# Patient Record
Sex: FEMALE | Race: WHITE | NOT HISPANIC OR LATINO | URBAN - METROPOLITAN AREA
[De-identification: names, ages, dates, MRNs, and addresses within clinical notes are randomized per-mention and may not be internally consistent; named-entity substitution may affect disease eponyms.]

---

## 2023-04-27 ENCOUNTER — WEB ENCOUNTER (OUTPATIENT)
Dept: URBAN - METROPOLITAN AREA CLINIC 9 | Facility: CLINIC | Age: 63
End: 2023-04-27

## 2023-05-01 ENCOUNTER — LAB OUTSIDE AN ENCOUNTER (OUTPATIENT)
Dept: URBAN - METROPOLITAN AREA CLINIC 9 | Facility: CLINIC | Age: 63
End: 2023-05-01

## 2023-05-01 PROBLEM — 300331000: Status: ACTIVE | Noted: 2023-05-01

## 2023-05-02 ENCOUNTER — TELEPHONE ENCOUNTER (OUTPATIENT)
Dept: URBAN - METROPOLITAN AREA CLINIC 9 | Facility: CLINIC | Age: 63
End: 2023-05-02

## 2023-05-02 ENCOUNTER — WEB ENCOUNTER (OUTPATIENT)
Dept: URBAN - METROPOLITAN AREA CLINIC 9 | Facility: CLINIC | Age: 63
End: 2023-05-02

## 2023-05-02 ENCOUNTER — DASHBOARD ENCOUNTERS (OUTPATIENT)
Age: 63
End: 2023-05-02

## 2023-05-02 ENCOUNTER — OFFICE VISIT (OUTPATIENT)
Dept: URBAN - METROPOLITAN AREA CLINIC 9 | Facility: CLINIC | Age: 63
End: 2023-05-02
Payer: MEDICARE

## 2023-05-02 VITALS
BODY MASS INDEX: 24.07 KG/M2 | DIASTOLIC BLOOD PRESSURE: 91 MMHG | HEIGHT: 64 IN | WEIGHT: 141 LBS | SYSTOLIC BLOOD PRESSURE: 130 MMHG

## 2023-05-02 DIAGNOSIS — K63.5 BENIGN COLON POLYP: ICD-10-CM

## 2023-05-02 DIAGNOSIS — K52.9 CHRONIC DIARRHEA: ICD-10-CM

## 2023-05-02 DIAGNOSIS — Z12.11 AVERAGE RISK FOR CRC. DUE TO PT'S CO-MORBID STATE WITH END STAGE DEMENTIA, HIGH RISK FOR ANESTHESIA (PER NEUROLOGY); INABILITY TO TAKE A BOWEL PREP....WOULD NOT ADVISE ANY COLORECTAL CANCER SCREENING INCLUDING STOOL TEST FOR FECAL BLOOD.: ICD-10-CM

## 2023-05-02 DIAGNOSIS — B19.20 HEPATITIS C VIRUS INFECTION WITHOUT HEPATIC COMA, UNSPECIFIED CHRONICITY: ICD-10-CM

## 2023-05-02 DIAGNOSIS — K76.9 LIVER LESION: ICD-10-CM

## 2023-05-02 PROCEDURE — 99204 OFFICE O/P NEW MOD 45 MIN: CPT | Performed by: INTERNAL MEDICINE

## 2023-05-02 RX ORDER — SODIUM, POTASSIUM,MAG SULFATES 17.5-3.13G
177ML SOLUTION, RECONSTITUTED, ORAL ORAL
Qty: 1 | Refills: 0

## 2023-05-02 RX ORDER — LEVOTHYROXINE SODIUM 112 UG/1
TAKE 1 TABLET BY MOUTH EVERY DAY TABLET ORAL
Qty: 90 EACH | Refills: 0 | Status: ACTIVE | COMMUNITY

## 2023-05-02 RX ORDER — LATANOPROST 50 UG/ML
INSTILL 1 DROP INTO BOTH EYES AT BEDTIME SOLUTION OPHTHALMIC
Qty: 10 MILLILITER | Refills: 0 | Status: ACTIVE | COMMUNITY

## 2023-05-02 RX ORDER — SODIUM, POTASSIUM,MAG SULFATES 17.5-3.13G
177ML SOLUTION, RECONSTITUTED, ORAL ORAL ONCE
Qty: 1 | Refills: 0 | OUTPATIENT

## 2023-05-02 RX ORDER — CONJUGATED ESTROGENS 0.62 MG/G
AS DIRECTED CREAM VAGINAL
Status: ACTIVE | COMMUNITY
Start: 2023-05-02

## 2023-05-02 NOTE — HPI-TODAY'S VISIT:
63-year-old female comes in for colon cancer screening and diarrhea. We have not imaged from 5/20/2022 that shows a 2 cm liver lesion.  She does have a history of hep C in the past.  Was told to repeat in 6 months to get a better look Patient is referred for diarrhea. Patient is referred for diarrhea. She is here to establish care.  She was getting regular surveillance colonoscopies by Dr. Caal.  She had one 5 years ago and had polyps.  She had a hysterectomy complicated by what sounds like a small bowel injury status post a resection.  Years later she had to have another small bowel resection due to adhesions.  She says since that time she has had about 4 loose stools a day though not totally loose or just soft.  No hematochezia weight loss abdominal pain.  She said her liver lesion was determined to be a hemangioma and this has been surveyed regularly.  She was treated for hep C with Epclusa and is status post SVR as per patient

## 2023-05-30 ENCOUNTER — OFFICE VISIT (OUTPATIENT)
Dept: URBAN - METROPOLITAN AREA SURGERY CENTER 9 | Facility: SURGERY CENTER | Age: 63
End: 2023-05-30
Payer: MEDICARE

## 2023-05-30 ENCOUNTER — CLAIMS CREATED FROM THE CLAIM WINDOW (OUTPATIENT)
Dept: URBAN - METROPOLITAN AREA CLINIC 4 | Facility: CLINIC | Age: 63
End: 2023-05-30
Payer: MEDICARE

## 2023-05-30 DIAGNOSIS — R19.7 DIARRHEA, UNSPECIFIED TYPE: ICD-10-CM

## 2023-05-30 DIAGNOSIS — K64.8 OTHER HEMORRHOIDS: ICD-10-CM

## 2023-05-30 DIAGNOSIS — K63.89 OTHER SPECIFIED DISEASES OF INTESTINE: ICD-10-CM

## 2023-05-30 PROCEDURE — 88342 IMHCHEM/IMCYTCHM 1ST ANTB: CPT | Performed by: PATHOLOGY

## 2023-05-30 PROCEDURE — 88313 SPECIAL STAINS GROUP 2: CPT | Performed by: PATHOLOGY

## 2023-05-30 PROCEDURE — 45380 COLONOSCOPY AND BIOPSY: CPT | Performed by: INTERNAL MEDICINE

## 2023-05-30 PROCEDURE — 88305 TISSUE EXAM BY PATHOLOGIST: CPT | Performed by: PATHOLOGY

## 2023-05-30 PROCEDURE — 45380 COLONOSCOPY AND BIOPSY: CPT | Performed by: CLINIC/CENTER

## 2023-05-30 RX ORDER — LEVOTHYROXINE SODIUM 112 UG/1
TAKE 1 TABLET BY MOUTH EVERY DAY TABLET ORAL
Qty: 90 EACH | Refills: 0 | Status: ACTIVE | COMMUNITY

## 2023-05-30 RX ORDER — SODIUM, POTASSIUM,MAG SULFATES 17.5-3.13G
177ML SOLUTION, RECONSTITUTED, ORAL ORAL
Qty: 1 | Refills: 0 | Status: ACTIVE | COMMUNITY

## 2023-05-30 RX ORDER — LATANOPROST 50 UG/ML
INSTILL 1 DROP INTO BOTH EYES AT BEDTIME SOLUTION OPHTHALMIC
Qty: 10 MILLILITER | Refills: 0 | Status: ACTIVE | COMMUNITY

## 2023-05-30 RX ORDER — CONJUGATED ESTROGENS 0.62 MG/G
AS DIRECTED CREAM VAGINAL
Status: ACTIVE | COMMUNITY
Start: 2023-05-02